# Patient Record
Sex: FEMALE | Race: WHITE
[De-identification: names, ages, dates, MRNs, and addresses within clinical notes are randomized per-mention and may not be internally consistent; named-entity substitution may affect disease eponyms.]

---

## 2020-06-14 ENCOUNTER — TRANSCRIPTION ENCOUNTER (OUTPATIENT)
Age: 35
End: 2020-06-14

## 2020-06-29 ENCOUNTER — TRANSCRIPTION ENCOUNTER (OUTPATIENT)
Age: 35
End: 2020-06-29

## 2021-01-13 ENCOUNTER — TRANSCRIPTION ENCOUNTER (OUTPATIENT)
Age: 36
End: 2021-01-13

## 2021-01-16 ENCOUNTER — TRANSCRIPTION ENCOUNTER (OUTPATIENT)
Age: 36
End: 2021-01-16

## 2021-07-13 ENCOUNTER — APPOINTMENT (OUTPATIENT)
Dept: GASTROENTEROLOGY | Facility: CLINIC | Age: 36
End: 2021-07-13
Payer: MEDICAID

## 2021-07-13 ENCOUNTER — NON-APPOINTMENT (OUTPATIENT)
Age: 36
End: 2021-07-13

## 2021-07-13 VITALS
HEART RATE: 74 BPM | BODY MASS INDEX: 20.89 KG/M2 | HEIGHT: 66 IN | OXYGEN SATURATION: 97 % | WEIGHT: 130 LBS | TEMPERATURE: 98.5 F | SYSTOLIC BLOOD PRESSURE: 92 MMHG | DIASTOLIC BLOOD PRESSURE: 56 MMHG

## 2021-07-13 DIAGNOSIS — Z78.9 OTHER SPECIFIED HEALTH STATUS: ICD-10-CM

## 2021-07-13 DIAGNOSIS — Z87.09 PERSONAL HISTORY OF OTHER DISEASES OF THE RESPIRATORY SYSTEM: ICD-10-CM

## 2021-07-13 DIAGNOSIS — Z80.3 FAMILY HISTORY OF MALIGNANT NEOPLASM OF BREAST: ICD-10-CM

## 2021-07-13 DIAGNOSIS — R10.9 UNSPECIFIED ABDOMINAL PAIN: ICD-10-CM

## 2021-07-13 DIAGNOSIS — Z82.49 FAMILY HISTORY OF ISCHEMIC HEART DISEASE AND OTHER DISEASES OF THE CIRCULATORY SYSTEM: ICD-10-CM

## 2021-07-13 DIAGNOSIS — Z87.440 PERSONAL HISTORY OF URINARY (TRACT) INFECTIONS: ICD-10-CM

## 2021-07-13 PROBLEM — Z00.00 ENCOUNTER FOR PREVENTIVE HEALTH EXAMINATION: Status: ACTIVE | Noted: 2021-07-13

## 2021-07-13 PROCEDURE — 99205 OFFICE O/P NEW HI 60 MIN: CPT

## 2021-07-13 RX ORDER — MAGNESIUM GLUCONATE 27 MG(500)
500 (27 MG) TABLET ORAL
Refills: 0 | Status: ACTIVE | COMMUNITY

## 2021-08-10 ENCOUNTER — APPOINTMENT (OUTPATIENT)
Dept: GASTROENTEROLOGY | Facility: CLINIC | Age: 36
End: 2021-08-10
Payer: MEDICAID

## 2021-08-10 LAB — CALPROTECTIN FECAL: 22 UG/G

## 2021-08-10 PROCEDURE — 99214 OFFICE O/P EST MOD 30 MIN: CPT | Mod: 95

## 2021-08-16 NOTE — REASON FOR VISIT
[Follow-Up: _____] : a [unfilled] follow-up visit [Initial Evaluation] : an initial evaluation [FreeTextEntry1] : LYNN

## 2021-08-16 NOTE — HISTORY OF PRESENT ILLNESS
[de-identified] : 36F with no pertinent PMHx self- referred for evaluation of SIBO. \par \par Pt reports in 2018 or 2019 went to [place] and came back and was very constipated. was not sick while in [place] \par parasite testing was negative. Right before trip took antibiotic for UTI then took suppository for yeast infection. \par became dependent on magnesium glycinate \par BM every day but uncomfortable \par \par ordered SIBO test online and was positive for methane \par candibactin AR/BR X 4 ROUNDS, fc-cial and dysbiocide and breaks with probiotics. just finished herbal treatment (was 3 months long) \par gassy, constipated, tons of food intolerances \par GI MAP - h. pylori + then test again and was negative, fecal calprotectin (Feb 2020) 2138 \par brother who lives with got SIBO round, friend who lived with got SIBO too \par \par currently has yeast infection \par \par currently taking Motegrity 2mg and BM every day, magnesium too. if misses a day of BM then has general abdominal pain/bloating. improved but feels permanently a little bloated \par rectal itchiness in the morning \par \par no xrays or scan of stomach \par no egd/cscope \par \par no weight loss (has gained some weight), no blood in stool \par \par FHX: no GI cancers \par \par SIBO origins:\par c- section\par  \par no surgeries to stomach \par no children

## 2021-08-16 NOTE — ASSESSMENT
[FreeTextEntry1] : 36F with no pertinent PMHx self- referred for evaluation of SIBO. \par \par Abdominal bloating, history of SIBO, symptoms improving \par - fecal calpro wnl, did not receive O/P results therefore will wait for results and if do not receive then may need to repeat \par - continue Motegrity and Magnesium combination with Senna instead of Mag 07 \par - SIBO breath test, if negative then consider fluconazole course to empirically treat SIFO \par - counseled on nutrition, lifestyle, gutbrain axis\par \par f/u with SIBO test results \par

## 2021-08-16 NOTE — ASSESSMENT
[FreeTextEntry1] : 36F with no pertinent PMHx self- referred for evaluation of SIBO. \par \par Abdominal bloating, history of SIBO, symptoms improving \par - fecal calpro wnl, did not receive O/P results therefore will wait for results and if do not receive then may need to repeat \par - continue Motegrity and Magnesium combination with Senna instead of Mag 07 \par - SIBO breath test, if negative then consider fluconazole course to empirically treat SIFO \par - counseled on nutrition, lifestyle, gutbrain\par \par f/u with SIBO test results \par

## 2021-08-16 NOTE — HISTORY OF PRESENT ILLNESS
[de-identified] : 36F with no pertinent PMHx self- referred for evaluation of SIBO. \par \par 8/10/21\par - same SIBO symptoms but somewhat under control \par - every day feels trapped gas after dinner\par - constipation- takes Magnesium (480mg mag glycinate) or mag 07 (diarrhea) if needed and Motegrity (2mg at night after eating dinner around midnight, dinner at 8pm. normal mg or motegrity normal bristol 4 but will go multiple times and difficult to go \par - curious if has SIFO because had bad yeast infection before getting SIBO \par - taking biocidin drops, regularly takes different herbs. was doing ADP prior which keeps it under control \par - open to taking antibiotics if SIBO positive \par \par Previous history: \par Pt reports in 2018 or 2019 went to [place] and came back and was very constipated. was not sick while in [place] \par parasite testing was negative. Right before trip took antibiotic for UTI then took suppository for yeast infection. \par became dependent on magnesium glycinate \par BM every day but uncomfortable \par \par ordered SIBO test online and was positive for methane \par candibactin AR/BR X 4 ROUNDS, fc-cial and dysbiocide and breaks with probiotics. just finished herbal treatment (was 3 months long) \par gassy, constipated, tons of food intolerances \par GI MAP - h. pylori + then test again and was negative, fecal calprotectin (Feb 2020) 2138 \par brother who lives with got SIBO round, friend who lived with got SIBO too \par \par currently has yeast infection \par \par currently taking Motegrity 2mg and BM every day, magnesium too. if misses a day of BM then has general abdominal pain/bloating. improved but feels permanently a little bloated \par rectal itchiness in the morning \par \par no xrays or scan of stomach \par no egd/cscope \par \par no weight loss (has gained some weight), no blood in stool \par \par FHX: no GI cancers \par \par SIBO origins:\par c- section\par  \par no surgeries to stomach \par no children  [Home] : at home, [unfilled] , at the time of the visit. [Medical Office: ([facility] [place])___] : at the medical office located in  [Verbal consent obtained from patient] : the patient, [unfilled]

## 2022-03-31 ENCOUNTER — APPOINTMENT (OUTPATIENT)
Dept: GASTROENTEROLOGY | Facility: CLINIC | Age: 37
End: 2022-03-31
Payer: MEDICAID

## 2022-03-31 VITALS
WEIGHT: 130 LBS | BODY MASS INDEX: 20.89 KG/M2 | HEIGHT: 66 IN | HEART RATE: 57 BPM | DIASTOLIC BLOOD PRESSURE: 60 MMHG | RESPIRATION RATE: 16 BRPM | OXYGEN SATURATION: 99 % | SYSTOLIC BLOOD PRESSURE: 90 MMHG | TEMPERATURE: 96.6 F

## 2022-03-31 DIAGNOSIS — K59.00 CONSTIPATION, UNSPECIFIED: ICD-10-CM

## 2022-03-31 DIAGNOSIS — K65.9 PERITONITIS, UNSPECIFIED: ICD-10-CM

## 2022-03-31 DIAGNOSIS — R14.0 ABDOMINAL DISTENSION (GASEOUS): ICD-10-CM

## 2022-03-31 DIAGNOSIS — K58.0 IRRITABLE BOWEL SYNDROME WITH DIARRHEA: ICD-10-CM

## 2022-03-31 PROCEDURE — 99215 OFFICE O/P EST HI 40 MIN: CPT

## 2022-03-31 RX ORDER — METRONIDAZOLE 250 MG/1
250 TABLET ORAL
Qty: 42 | Refills: 0 | Status: ACTIVE | COMMUNITY
Start: 2022-03-31 | End: 1900-01-01

## 2022-03-31 RX ORDER — RIFAXIMIN 550 MG/1
550 TABLET ORAL
Qty: 42 | Refills: 0 | Status: ACTIVE | COMMUNITY
Start: 2022-03-31 | End: 1900-01-01

## 2022-03-31 NOTE — PHYSICAL EXAM
[General Appearance - Alert] : alert [General Appearance - In No Acute Distress] : in no acute distress [General Appearance - Well Nourished] : well nourished [General Appearance - Well Developed] : well developed [General Appearance - Well-Appearing] : healthy appearing [Sclera] : the sclera and conjunctiva were normal [PERRL With Normal Accommodation] : pupils were equal in size, round, and reactive to light [Extraocular Movements] : extraocular movements were intact [Hearing Threshold Finger Rub Not [place]] : hearing was normal [Examination Of The Oral Cavity] : the lips and gums were normal [Neck Cervical Mass (___cm)] : no neck mass was observed [Jugular Venous Distention Increased] : there was no jugular-venous distention [Respiration, Rhythm And Depth] : normal respiratory rhythm and effort [Exaggerated Use Of Accessory Muscles For Inspiration] : no accessory muscle use [Heart Rate And Rhythm] : heart rate was normal and rhythm regular [Edema] : there was no peripheral edema [Veins - Varicosity Changes] : there were no varicosital changes [Bowel Sounds] : normal bowel sounds [Abdomen Soft] : soft [Abdomen Tenderness] : non-tender [Abdomen Mass (___ Cm)] : no abdominal mass palpated [Nail Clubbing] : no clubbing  or cyanosis of the fingernails [Involuntary Movements] : no involuntary movements were seen [] : no rash [Skin Lesions] : no skin lesions [Sensation] : the sensory exam was normal to light touch and pinprick [No Focal Deficits] : no focal deficits [Oriented To Time, Place, And Person] : oriented to person, place, and time [Impaired Insight] : insight and judgment were intact

## 2022-04-04 NOTE — ASSESSMENT
[FreeTextEntry1] : 36yF presenting for follow-up of SIBO and bloating\par \par SIBO - Patient presenting with results from SIBO breath test showing H2 8; CH4 15, suggestive of [place] predominant SIBO, likely responsible for her bloating symptoms and in part her constipation.\par - Patient is a DJ and has some mild baseline tinnitus - will avoid Neomycin\par - Flagyl 250mg TID and Xifaxan 550mg TID x2 weeks\par - Counseled on EtOH avoidance during and 3 days after therapy\par - Prokinetic following antibiotics - Motegrity 0.5mg QD and titrate up to 2mg\par - Low FODMAP diet after treatment\par - IBGuard, Yoga, Gutbrain modulation\par \par Constipation - Patient continues to suffer from constipation, not improved with Motegrity, though thinks Magnesium helps.\par - Consider ARM if not improved after treatment if not improved - would require colon or flex prior\par - Continue Magnesium, SunFiber\par \par RTC one month after completes treatment\par Patient seen and discussed with attending physician

## 2022-04-04 NOTE — HISTORY OF PRESENT ILLNESS
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 Patient has been on herbal antimicrobials, restrictive diets, several supplements since her last visit. She ran out of Motegrity and tried a probiotic after which her symptoms spiked, but the returned to a tolerable level. She is currently taking Saccharomyces and Cassie powder with some improvement and feels roughly the same as when she was on Motegrity. She completed a SIBO test and brought the results with her showing a H2 of 8 and a CH4 of 15, suggestive of methane predominant SIBO. She has never taken antibiotics for SIBO, but is open to the idea given the chronicity of her symptoms. Has never had a endoscopy or colonoscopy. Denies skin rashes, joint pain, change in weight.

## 2022-04-12 ENCOUNTER — TRANSCRIPTION ENCOUNTER (OUTPATIENT)
Age: 37
End: 2022-04-12

## 2022-06-25 ENCOUNTER — TRANSCRIPTION ENCOUNTER (OUTPATIENT)
Age: 37
End: 2022-06-25

## 2022-06-28 ENCOUNTER — APPOINTMENT (OUTPATIENT)
Dept: GASTROENTEROLOGY | Facility: CLINIC | Age: 37
End: 2022-06-28
Payer: MEDICAID

## 2022-06-28 PROCEDURE — 99204 OFFICE O/P NEW MOD 45 MIN: CPT | Mod: 95

## 2022-06-28 NOTE — ASSESSMENT
[FreeTextEntry1] : 36yF presenting for follow-up of SIBO and bloating\par \par SIBO - Patient presenting with results from SIBO breath test showing H2 8; CH4 15, suggestive of IMO, likely responsible for her bloating symptoms and in part her constipation.\par - Patient is a DJ and has some mild baseline tinnitus - will avoid Neomycin\par - s/p Xifaxan 550mg TID x2 weeks\par - Counseled on EtOH avoidance\par - Prokinetic following antibiotics - Motegrity 0.5mg QD and titrate up to 2mg\par - Low FODMAP diet after treatment x6wks\par - IBGuard, Yoga, Gutbrain modulation\par \par Constipation - Patient continues to suffer from constipation, not improved with Motegrity, though thinks Magnesium helps.\par - Consider ARM if not improved after treatment if not improved - would require colon or flex prior\par - decrease Magnesium while increasing, SunFiber, other natural laxatives \par \par RTC 2-3 she'll let me know how shes doing \par

## 2022-06-28 NOTE — HISTORY OF PRESENT ILLNESS
[de-identified] : Patient is a 36yF presenting for follow-up of bloating and IMO\par TELEMEDICINE AS pT has covid\par \par 6/28/22\par took xifaxan, no flagyl\par took allimed \par didn’t feel different on treatment but now better\par constipation did not change - on magnesium mg 07 3 pills \par motegrity 1mg \par GLUTEN and WINE  are triggers\par \par \par PREVIOUS HX\par Patient has been on herbal antimicrobials, restrictive diets, several supplements since her last visit. She ran out of Motegrity and tried a probiotic after which her symptoms spiked, but the returned to a tolerable level. She is currently taking Saccharomyces and Cassie powder with some improvement and feels roughly the same as when she was on Motegrity. She completed a SIBO test and brought the results with her showing a H2 of 8 and a CH4 of 15, suggestive of methane predominant SIBO. She has never taken antibiotics for SIBO, but is open to the idea given the chronicity of her symptoms. Has never had a endoscopy or colonoscopy. Denies skin rashes, joint pain, change in weight.

## 2022-06-28 NOTE — REASON FOR VISIT
[Home] : at home, [unfilled] , at the time of the visit. [place] [place] : at the medical office located in  [Patient] : the patient [Follow-Up: _____] : a [unfilled] follow-up visit

## 2022-10-21 RX ORDER — PRUCALOPRIDE 2 MG/1
2 TABLET, FILM COATED ORAL
Qty: 30 | Refills: 2 | Status: ACTIVE | COMMUNITY

## 2022-10-27 ENCOUNTER — TRANSCRIPTION ENCOUNTER (OUTPATIENT)
Age: 37
End: 2022-10-27